# Patient Record
Sex: FEMALE | Race: WHITE | NOT HISPANIC OR LATINO | Employment: FULL TIME | URBAN - METROPOLITAN AREA
[De-identification: names, ages, dates, MRNs, and addresses within clinical notes are randomized per-mention and may not be internally consistent; named-entity substitution may affect disease eponyms.]

---

## 2023-05-25 ENCOUNTER — VIRTUAL VISIT (OUTPATIENT)
Dept: SURGERY | Facility: CLINIC | Age: 43
End: 2023-05-25
Payer: COMMERCIAL

## 2023-05-25 ENCOUNTER — TELEPHONE (OUTPATIENT)
Dept: ENDOCRINOLOGY | Facility: CLINIC | Age: 43
End: 2023-05-25

## 2023-05-25 ENCOUNTER — PREP FOR PROCEDURE (OUTPATIENT)
Dept: SURGERY | Facility: CLINIC | Age: 43
End: 2023-05-25

## 2023-05-25 VITALS — HEIGHT: 69 IN | WEIGHT: 160 LBS | BODY MASS INDEX: 23.7 KG/M2

## 2023-05-25 DIAGNOSIS — K56.1 INTUSSUSCEPTION OF JEJUNUM (H): Primary | ICD-10-CM

## 2023-05-25 DIAGNOSIS — K56.1 JEJUNAL INTUSSUSCEPTION (H): Primary | ICD-10-CM

## 2023-05-25 PROCEDURE — 99203 OFFICE O/P NEW LOW 30 MIN: CPT | Mod: VID | Performed by: SURGERY

## 2023-05-25 RX ORDER — PANTOPRAZOLE SODIUM 40 MG/1
40 TABLET, DELAYED RELEASE ORAL 2 TIMES DAILY
COMMUNITY
Start: 2023-05-17 | End: 2024-05-16

## 2023-05-25 RX ORDER — MULTIPLE VITAMINS W/ MINERALS TAB 9MG-400MCG
1 TAB ORAL EVERY MORNING
COMMUNITY

## 2023-05-25 RX ORDER — POLYETHYLENE GLYCOL 3350 17 G/17G
17 POWDER, FOR SOLUTION ORAL 2 TIMES DAILY
COMMUNITY
Start: 2023-05-22 | End: 2023-06-01

## 2023-05-25 RX ORDER — DICYCLOMINE HYDROCHLORIDE 10 MG/1
10 CAPSULE ORAL 4 TIMES DAILY
COMMUNITY
Start: 2023-05-22 | End: 2023-06-01

## 2023-05-25 ASSESSMENT — PAIN SCALES - GENERAL: PAINLEVEL: SEVERE PAIN (6)

## 2023-05-25 NOTE — TELEPHONE ENCOUNTER
FUTURE VISIT INFORMATION      SURGERY INFORMATION:    Date: 23    Location: [facility] or    Surgeon:  Malcolm Bartlett MD    Anesthesia Type:  general    Procedure: Exploratory laparoscopy; intestinal plication; possible small bowel resection; possible open    Consult: virtual visit 23    RECORDS REQUESTED FROM:       Primary Care Provider: Ihsan Hubbard MD  - [facility]    Most recent EKG+ Tracin/15/21- [facility]

## 2023-05-25 NOTE — TELEPHONE ENCOUNTER
Patient called to schedule surgery with Dr. Bartlett, seen via video visit today. She is a patient of his at [facility] in [place]. Scheduled Monday, June 12 at 10:45 a.m. She will need to arrive at [place] at 8:45 a.m., address given.   Scheduled PAC video visit on May 30 at 9 a.m.

## 2023-05-25 NOTE — LETTER
2023       RE: Reina Marmolejo  [address] [facility] [address]       Dear Colleague,    Thank you for referring your patient, Reina Marmolejo, to the [facility] [place] at [facility]. Please see a copy of my visit note below.        Return Bariatric Surgery Note    RE: Reina Marmolejo  MR#: [id number]  : 1980  VISIT DATE: May 25, 2023      I had the pleasure of seeing your patient, Reina Marmolejo, in my post-bariatric surgery assessment clinic.    CHIEF COMPLAINT: Post-bariatric surgery follow-up    Reina is well-known to my bariatric team in [facility] and I saw her today by video visit from my clinic in [place].    She has long-standing history of right of midline abdominal pain; started initially in midline; this causes sometimes quite severe episodes of pain.    Has intussusception on CT scan; this was not seen previously.    Had RYGB, open; divided technique by Dr. Salas, [facility] in .  Operative report appended below.    Had endoscopy by me in [place] last week; had nice relief of pain after the endoscopy (despite no interventions; no findings as cause of pain).  This would be theoretically consistent with reduction of the intussusception for a period.  CT scan was ordered out of that visit.      CT scan from last week as follows:           Weight: 72.6 kg (160 lb)       Social History:       View : No data to display.                Medications:  Current Outpatient Medications   Medication    cholecalciferol (D-VI-SOL, VITAMIN D3) 10 mcg/mL (400 units/mL) LIQD liquid    dicyclomine (BENTYL) 10 MG capsule    levonorgestrel (MIRENA) 20 MCG/DAY IUD    multivitamin w/minerals (MULTI-VITAMIN) tablet    pantoprazole (PROTONIX) 40 MG EC tablet    polyethylene glycol (MIRALAX) 17 GM/Dose powder     No current facility-administered medications for this visit.          View : No data  "to display.                ROS:  GI:        View : No data to display.              Skin:        View : No data to display.              Psych:        View : No data to display.              :        View : No data to display.                LABS/IMAGING/MEDICAL RECORDS REVIEW: reviewed CT scan from [facility]; imaging available in [facility] medical record.      PHYSICAL EXAMINATION:  Ht 1.753 m (5' 9\")   Wt 72.6 kg (160 lb)   BMI 23.63 kg/m     NAD  Breathing unlabored.  Rest of exam deferred due to virtual visit      ASSESSMENT AND PLAN:      1. 20 years status open gastric bypass by [facility] Sal[facility].  2. Morbid Obesity historical current BMI: Body mass index is 23.63 kg/m .  3. Post surgical malabsorption:   Labs ordered per protocol.   Follow food plan per dietitian recommendations.   Continue taking recommended post-op vitamins.  4. Return to clinic as needed.    5. Exploratory laparoscopy indicated to evaluate for intussusception; will plan plication of intestine; potentially will need bowel resection and redo of jejunojejunostomy.  Would be 120 minutes of surgery time.  Possible open surgery also discussed with Reina.    [facility]; Call [facility] at [phone] for scheduling.    Need preop H&P within 30 days of surgery; can do with primary or video PAC.    Plan SDS, possible overnight observation if conversion to open and/or resection needed.          Sincerely,    Malcolm Bartlett MD    I spent a total of 30 minutes face to face with Reina during today's office visit. Over 50% of this time was spent counseling the patient and/or coordinating care.      Procedures          PREOPERATIVE DIAGNOSIS:       Morbid obesity.            POSTOPERATIVE DIAGNOSIS:      Same.            OPERATIVE PROCEDURE:    DIVIDED KERRY-Y GASTRIC BYPASS, PAXTON GASTROSTOMY,                                    INTRAOPERATIVE ULTRASONOGRAPHY.            SURGEON:   Toney Salas MD            ANESTHESIA:  General " with endotracheal tube.            INDICATIONS FOR PROCEDURE:   This 22-year-old female has a history of      progressive weight gain.  Her body mass index exceeds morbidly obese      levels.  She has attempted weight loss unsuccessfully utilizing a variety      of medical weight loss programs, as documented in the admission history and      physical.  She suffers obesity-related co-morbidities, including chronic      dyspnea on exertion and fatigue, discomfort in weightbearing joints,      urinary stress incontinence, psychosocial problems secondary to obesity,      hyperlipidemia and in addition has a strong family history of morbid      obesity and obesity-related illnesses.  She has been counseled concerning      the disease of morbid obesity, including its causation and health risks,      and a variety of weight loss programs and their relative success rates were      described.  The operation of divided Dayrn-Y gastric bypass was discussed in      detail, including how the operation is performed and how it aids in weight      loss and weight maintenance.  Selection criteria, contraindications, short      and long term risks, and other sequelae of operation and a realistic      expectation of weight loss were thoroughly discussed.  The prime importance      of her own personal commitment to permanent changes in eating behaviors and      other healthy lifestyle changes following surgery in order to be successful      was stressed, especially use of the small gastric pouch as a tool to aid in      appetite control so as to successfully restrict the types and amounts of      foods ingested on a daily basis.  The importance of long term medical      follow-up and behavioral aftercare to the long term success of the program      was stressed.  She underwent preoperative dietary counseling and a      multidisciplinary psychological and medical evaluation with no      contraindications to a bariatric procedure  identified.  Smoking cessation      was accomplished and she was started on preoperative pulmonary toilet and      was admitted for a divided Daryn-Y gastric bypass, Leola gastrostomy and      intraoperative ultrasonography.            OPERATIVE FINDINGS:  Considerable subcutaneous and intra-abdominal fat was      noted.  The liver was of normal size and consistency.  The gallbladder was      free of palpable calculi and had no gallstones on ultrasonography.  The      spleen, distal esophagus, stomach, duodenum, pancreas, large and small      bowel, uterus, tubes and ovaries were normal to inspection and/or      palpation.            Continued . . .            COMPLICATIONS:  None apparent.            SPONGE, NEEDLE AND INSTRUMENT COUNTS:  Correct.            ESTIMATED BLOOD LOSS:   175 cc.            PROCEDURE:  The patient received preoperative IV cefotetan.  An epidural      catheter was inserted by the anesthesiologist for postoperative pain      control.  General endotracheal anesthesia was induced.  Sequential      compression stockings were applied, and a Michelle catheter and balloon      orogastric tubes were positioned.  The patient's abdomen was prepped with      Prevail and sterilely draped.            An upper midline incision was made transecting the midline fascia and      peritoneum.  Exploration was performed with findings as noted above.  A      wound protector was inserted.  Intraoperative ultrasonography was performed      with normal findings.  The ligament of Treitz was identified and a 45 cm      proximal biliopancreatic jejunal limb length measured along the      antimesenteric surface of the proximal jejunum.  The jejunum was divided      and ligated at this location with the Endopath linear stapler with 3.5 mm      staples.  Its mesentery was divided and ligated with the Endopath stapler      with 2.0 mm staples.  The 3-0 silk stay sutures were applied to the      mesenteric and antimesenteric  corners of the distally transected jejunal      segment, which will become the proximal alimentary limb.  A 30 inch      alimentary limb length was measured along the antimesenteric surface of the      proximal alimentary limb.  The antimesenteric surface of the distal      alimentary limb was approximated to the antimesenteric surface of the      distal biliopancreatic jejunal limb with two 3-0 silk stay sutures.  An      antimesenteric jejunotomy was created in the distal alimentary limb and      some of the staples closing the antimesenteric corner of the distal      biliopancreatic jejunal limb were excised.  One blade each of an open      Endopath linear stapler with 3.5 mm staples was inserted into each lumen      and the instrument was closed and fired simultaneously stapling and      dividing the opposing antimesenteric jejunal walls.  Intraluminal      inspection revealed one small bleeding point which was controlled with a      figure-of-eight suture of 3-0 chromic.  The anastomosis was completed with      an inner layer of continuous 4-0 chromic Shelli sutures and an outer layer      of interrupted seromuscular 3-0 silk sutures.  The distal biliopancreatic      jejunal wall was imbricated around the distal everted staple line closure      with interrupted  3-0 silk sutures.  A 3-0 silk suture was placed at the      heel of the anastomosis to take tension off of it.  A viable and palpable      anastomosis was noted.            The patient was placed in reversed Trendelenburg position.  With the aid of      the Omni bariatric retractor, the costal margins and left hepatic lobe were                  Continued . . .      retracted.  The distal esophagus was bluntly encircled in a Penrose drain.      The gastric balloon of the orogastric tube was inflated by the anesthetist      with exactly 15 cc of air and snugged at the gastroesophageal junction.      The balloon was utilized as a guide to measure a  proximal lesser curvature      15 cc gastric pouch, the periphery of which was marked with several silk      sutures on the anterior gastric wall.  A suitable location for      gastrojejunostomy was selected in what will become the proximal gastric      pouch and marked with cautery.  The balloon was deflated and the orogastric      tube withdrawn into the distal esophagus.  A small window was created in      the upper gastrohepatic ligament adjacent to the cardia.  The cardia was      then encircled in two Spence catheters.  A short transverse gastrotomy      was created in the anterior wall of the gastric body adjacent to the lesser      curvature.  The anvil of an Ethicon-25 mm circular stapler was inserted      through the gastrotomy and its shanda made to exit the anterior gastric wall      at the site selected for the gastrojejunostomy.  The gastrotomy was closed      with an inner layer of continuous locking 4-0 chromic sutures and an outer      layer of interrupted seromuscular 3-0 silk sutures.  The proximal and      distal gastric pouches were constructed by multiple applications of the      Endopath linear stapler with 3.5 mm staples guided by the Spence      catheters and with reference to previously placed silk sutures.  In      addition, the orogastric tube was temporarily reinserted into the proximal      gastric pouch as a guide to the pouch and distal esophagus.  The staple      lines appeared intact without hemorrhage.  The walls of the proximal      gastric pouch were imbricated around the proximal gastric staple line with      multiple interrupted 3-0 silk sutures.  A retrocolic/retrogastric tunnel      was constructe   d.  The proximal alimentary limb was advanced through the      retrocolic/retrogastric tunnel with care to avoid twisting the bowel or its      mesentery.  The staples closing the proximal alimentary limb were excised.      The cartridge of an Ethicon-25 mm circular stapler was  inserted through the      open end of the alimentary limb and advanced approximately 15 cm distally.      The shanda of the cartridge was advanced through the antimesenteric bowel      wall.  The exposed rods of the cartridge and anvil were mated and the      stapler was closed to the prescribed thickness, taking care to avoid      introduction of extraneous tissue.  The stapler was fired and opened      one-half turn.  By rotation in both directions the stapler was removed      through the proximal alimentary limb.  Inspection revealed an intact      appearing gastrojejunostomy.  The gastrojejunostomy was reinforced with a      continuous imbricating circumferential 3-0 Monocryl suture.  The proximal      alimentary limb was closed with an inner layer of continuous 4-0 chromic      Orleans sutures and an outer layer of interrupted seromuscular 3-0 silk      sutures.            The orogastric tube was then removed and an 18 sump nasogastric tube      advanced by the anesthetist and guided through the gastrojejunostomy and      down the efferent limb for approximately one inch.  The patient was then      placed flat and the upper abdomen was filled with saline.  Following finger                  Continued . . .      occlusion of the afferent and efferent limbs of the gastrojejunostomy      distal to the NG tube tip, the anesthetist distended the proximal gastric      pouch and perianastomotic jejunum with air.  No bubbling was noted,      indicating air tightness of the gastrojejunostomy and proximal gastric      staple lines.  The afferent limb was then interposed between the proximal      and distal gastric staple line closures.  The antimesenteric wall of the      afferent limb was imbricated around the proximal gastric staple line      closure with multiple interrupted 3-0 silk sutures to physically separate      the proximal and distal gastric pouch and discourage gastrogastric      fistulization.  Redundancy of  the efferent limb was reduced below the level      of the transverse mesocolon.  All potential internal hernial defects were      closed with continuous 2-0 silk, including those at Guerrier's space,      transverse mesocolon and at the mesenteric defect of the jejunojejunostomy.            The abdomen was then irrigated with copious amounts of saline and Kefzol      solution.  Hemostasis was satisfactory.  A 19 Gaurav drain was inserted      through a left flank stab wound and positioned in the left subphrenic space      with its tip adjacent to the anterior aspect of the gastrojejunostomy.  The      drain was sutured to the skin with 2-0 silk.  An 18 silicone Michelle catheter      was introduced into the abdomen through a left upper quadrant stab wound.      Two concentric pursestring sutures of 2-0 silk were placed in the anterior      body of the bypassed stomach along the greater curvature.  A gastrotomy was      created within the pursestring sutures and the Michelle tip was introduced      intraluminally.  The balloon was inflated and the pursestring sutures tied.      The gastric wall just peripheral to where the tube entered the stomach was      sutured to the posterior aspect of the anterior abdominal wall just      peripheral to where the tube entered the abdomen with multiple interrupted      circumferentially placed 2-0 silk sutures.  A 2-0 silk suture fixed the      tube to the skin.  The midline fascia was reapproximated with continuous #1      PDS.  The subcutaneous layer was irrigated with saline.  The skin was      closed with skin staples.  A sterile dressing was applied.  A Michelle      drainage bag was connected to the gastrostomy and a reservoir bulb to the      Gaurav drain.            Anesthesia was discontinued.  She appeared to tolerate the procedure well.                                                                        Toney Salas MD      bab      D: 02/11/2003 4:02 P      T:  02/11/2003  6:00 P      Doc #: [id number]      cc:   MD Toney Vidal MD              Again, thank you for allowing me to participate in the care of your patient.      Sincerely,    Malcolm Bartlett MD

## 2023-05-25 NOTE — PROGRESS NOTES
Return Bariatric Surgery Note    RE: Reina Marmolejo  MR#: [id number]  : 1980  VISIT DATE: May 25, 2023      I had the pleasure of seeing your patient, Reina Marmolejo, in my post-bariatric surgery assessment clinic.    CHIEF COMPLAINT: Post-bariatric surgery follow-up    Reina is well-known to my bariatric team in [facility] and I saw her today by video visit from my clinic in [place].    She has long-standing history of right of midline abdominal pain; started initially in midline; this causes sometimes quite severe episodes of pain.    Has intussusception on CT scan; this was not seen previously.    Had RYGB, open; divided technique by Dr. Salas, [facility] in .  Operative report appended below.    Had endoscopy by me in [place] last week; had nice relief of pain after the endoscopy (despite no interventions; no findings as cause of pain).  This would be theoretically consistent with reduction of the intussusception for a period.  CT scan was ordered out of that visit.      CT scan from last week as follows:           Weight: 72.6 kg (160 lb)       Social History:       View : No data to display.                Medications:  Current Outpatient Medications   Medication     cholecalciferol (D-VI-SOL, VITAMIN D3) 10 mcg/mL (400 units/mL) LIQD liquid     dicyclomine (BENTYL) 10 MG capsule     levonorgestrel (MIRENA) 20 MCG/DAY IUD     multivitamin w/minerals (MULTI-VITAMIN) tablet     pantoprazole (PROTONIX) 40 MG EC tablet     polyethylene glycol (MIRALAX) 17 GM/Dose powder     No current facility-administered medications for this visit.          View : No data to display.                ROS:  GI:        View : No data to display.              Skin:        View : No data to display.              Psych:        View : No data to display.              :        View : No data to display.                LABS/IMAGING/MEDICAL RECORDS REVIEW: reviewed CT scan from [facility]  "system; imaging available in [facility] medical record.      PHYSICAL EXAMINATION:  Ht 1.753 m (5' 9\")   Wt 72.6 kg (160 lb)   BMI 23.63 kg/m     NAD  Breathing unlabored.  Rest of exam deferred due to virtual visit      ASSESSMENT AND PLAN:      1. 20 years status open gastric bypass by [facility] Sal[facility].  2. Morbid Obesity historical current BMI: Body mass index is 23.63 kg/m .  3. Post surgical malabsorption:   Labs ordered per protocol.   Follow food plan per dietitian recommendations.   Continue taking recommended post-op vitamins.  4. Return to clinic as needed.    5. Exploratory laparoscopy indicated to evaluate for intussusception; will plan plication of intestine; potentially will need bowel resection and redo of jejunojejunostomy.  Would be 120 minutes of surgery time.  Possible open surgery also discussed with Reina.    [facility]; Call [facility] at [phone] for scheduling.    Need preop H&P within 30 days of surgery; can do with primary or video PAC.    Plan SDS, possible overnight observation if conversion to open and/or resection needed.          Sincerely,    Malcolm Bartlett MD    I spent a total of 30 minutes face to face with Reina during today's office visit. Over 50% of this time was spent counseling the patient and/or coordinating care.      Procedures          PREOPERATIVE DIAGNOSIS:       Morbid obesity.            POSTOPERATIVE DIAGNOSIS:      Same.            OPERATIVE PROCEDURE:    DIVIDED KERRY-Y GASTRIC BYPASS, PAXTON GASTROSTOMY,                                    INTRAOPERATIVE ULTRASONOGRAPHY.            SURGEON:   Toney Salas MD            ANESTHESIA:  General with endotracheal tube.            INDICATIONS FOR PROCEDURE:   This 22-year-old female has a history of      progressive weight gain.  Her body mass index exceeds morbidly obese      levels.  She has attempted weight loss unsuccessfully utilizing a variety      of medical weight loss programs, as documented in " the admission history and      physical.  She suffers obesity-related co-morbidities, including chronic      dyspnea on exertion and fatigue, discomfort in weightbearing joints,      urinary stress incontinence, psychosocial problems secondary to obesity,      hyperlipidemia and in addition has a strong family history of morbid      obesity and obesity-related illnesses.  She has been counseled concerning      the disease of morbid obesity, including its causation and health risks,      and a variety of weight loss programs and their relative success rates were      described.  The operation of divided Daryn-Y gastric bypass was discussed in      detail, including how the operation is performed and how it aids in weight      loss and weight maintenance.  Selection criteria, contraindications, short      and long term risks, and other sequelae of operation and a realistic      expectation of weight loss were thoroughly discussed.  The prime importance      of her own personal commitment to permanent changes in eating behaviors and      other healthy lifestyle changes following surgery in order to be successful      was stressed, especially use of the small gastric pouch as a tool to aid in      appetite control so as to successfully restrict the types and amounts of      foods ingested on a daily basis.  The importance of long term medical      follow-up and behavioral aftercare to the long term success of the program      was stressed.  She underwent preoperative dietary counseling and a      multidisciplinary psychological and medical evaluation with no      contraindications to a bariatric procedure identified.  Smoking cessation      was accomplished and she was started on preoperative pulmonary toilet and      was admitted for a divided Daryn-Y gastric bypass, Leola gastrostomy and      intraoperative ultrasonography.            OPERATIVE FINDINGS:  Considerable subcutaneous and intra-abdominal fat was      noted.   The liver was of normal size and consistency.  The gallbladder was      free of palpable calculi and had no gallstones on ultrasonography.  The      spleen, distal esophagus, stomach, duodenum, pancreas, large and small      bowel, uterus, tubes and ovaries were normal to inspection and/or      palpation.            Continued . . .            COMPLICATIONS:  None apparent.            SPONGE, NEEDLE AND INSTRUMENT COUNTS:  Correct.            ESTIMATED BLOOD LOSS:   175 cc.            PROCEDURE:  The patient received preoperative IV cefotetan.  An epidural      catheter was inserted by the anesthesiologist for postoperative pain      control.  General endotracheal anesthesia was induced.  Sequential      compression stockings were applied, and a Michelle catheter and balloon      orogastric tubes were positioned.  The patient's abdomen was prepped with      Prevail and sterilely draped.            An upper midline incision was made transecting the midline fascia and      peritoneum.  Exploration was performed with findings as noted above.  A      wound protector was inserted.  Intraoperative ultrasonography was performed      with normal findings.  The ligament of Treitz was identified and a 45 cm      proximal biliopancreatic jejunal limb length measured along the      antimesenteric surface of the proximal jejunum.  The jejunum was divided      and ligated at this location with the Endopath linear stapler with 3.5 mm      staples.  Its mesentery was divided and ligated with the Endopath stapler      with 2.0 mm staples.  The 3-0 silk stay sutures were applied to the      mesenteric and antimesenteric corners of the distally transected jejunal      segment, which will become the proximal alimentary limb.  A 30 inch      alimentary limb length was measured along the antimesenteric surface of the      proximal alimentary limb.  The antimesenteric surface of the distal      alimentary limb was approximated to the  antimesenteric surface of the      distal biliopancreatic jejunal limb with two 3-0 silk stay sutures.  An      antimesenteric jejunotomy was created in the distal alimentary limb and      some of the staples closing the antimesenteric corner of the distal      biliopancreatic jejunal limb were excised.  One blade each of an open      Endopath linear stapler with 3.5 mm staples was inserted into each lumen      and the instrument was closed and fired simultaneously stapling and      dividing the opposing antimesenteric jejunal walls.  Intraluminal      inspection revealed one small bleeding point which was controlled with a      figure-of-eight suture of 3-0 chromic.  The anastomosis was completed with      an inner layer of continuous 4-0 chromic [place] sutures and an outer layer      of interrupted seromuscular 3-0 silk sutures.  The distal biliopancreatic      jejunal wall was imbricated around the distal everted staple line closure      with interrupted  3-0 silk sutures.  A 3-0 silk suture was placed at the      heel of the anastomosis to take tension off of it.  A viable and palpable      anastomosis was noted.            The patient was placed in reversed Trendelenburg position.  With the aid of      the Omni bariatric retractor, the costal margins and left hepatic lobe were                  Continued . . .      retracted.  The distal esophagus was bluntly encircled in a Penrose drain.      The gastric balloon of the orogastric tube was inflated by the anesthetist      with exactly 15 cc of air and snugged at the gastroesophageal junction.      The balloon was utilized as a guide to measure a proximal lesser curvature      15 cc gastric pouch, the periphery of which was marked with several silk      sutures on the anterior gastric wall.  A suitable location for      gastrojejunostomy was selected in what will become the proximal gastric      pouch and marked with cautery.  The balloon was deflated and the  orogastric      tube withdrawn into the distal esophagus.  A small window was created in      the upper gastrohepatic ligament adjacent to the cardia.  The cardia was      then encircled in two Spence catheters.  A short transverse gastrotomy      was created in the anterior wall of the gastric body adjacent to the lesser      curvature.  The anvil of an Ethicon-25 mm circular stapler was inserted      through the gastrotomy and its shanda made to exit the anterior gastric wall      at the site selected for the gastrojejunostomy.  The gastrotomy was closed      with an inner layer of continuous locking 4-0 chromic sutures and an outer      layer of interrupted seromuscular 3-0 silk sutures.  The proximal and      distal gastric pouches were constructed by multiple applications of the      Endopath linear stapler with 3.5 mm staples guided by the Spence      catheters and with reference to previously placed silk sutures.  In      addition, the orogastric tube was temporarily reinserted into the proximal      gastric pouch as a guide to the pouch and distal esophagus.  The staple      lines appeared intact without hemorrhage.  The walls of the proximal      gastric pouch were imbricated around the proximal gastric staple line with      multiple interrupted 3-0 silk sutures.  A retrocolic/retrogastric tunnel      was constructe   d.  The proximal alimentary limb was advanced through the      retrocolic/retrogastric tunnel with care to avoid twisting the bowel or its      mesentery.  The staples closing the proximal alimentary limb were excised.      The cartridge of an Ethicon-25 mm circular stapler was inserted through the      open end of the alimentary limb and advanced approximately 15 cm distally.      The shanda of the cartridge was advanced through the antimesenteric bowel      wall.  The exposed rods of the cartridge and anvil were mated and the      stapler was closed to the prescribed thickness, taking care to  avoid      introduction of extraneous tissue.  The stapler was fired and opened      one-half turn.  By rotation in both directions the stapler was removed      through the proximal alimentary limb.  Inspection revealed an intact      appearing gastrojejunostomy.  The gastrojejunostomy was reinforced with a      continuous imbricating circumferential 3-0 Monocryl suture.  The proximal      alimentary limb was closed with an inner layer of continuous 4-0 chromic      Shelli sutures and an outer layer of interrupted seromuscular 3-0 silk      sutures.            The orogastric tube was then removed and an 18 sump nasogastric tube      advanced by the anesthetist and guided through the gastrojejunostomy and      down the efferent limb for approximately one inch.  The patient was then      placed flat and the upper abdomen was filled with saline.  Following finger                  Continued . . .      occlusion of the afferent and efferent limbs of the gastrojejunostomy      distal to the NG tube tip, the anesthetist distended the proximal gastric      pouch and perianastomotic jejunum with air.  No bubbling was noted,      indicating air tightness of the gastrojejunostomy and proximal gastric      staple lines.  The afferent limb was then interposed between the proximal      and distal gastric staple line closures.  The antimesenteric wall of the      afferent limb was imbricated around the proximal gastric staple line      closure with multiple interrupted 3-0 silk sutures to physically separate      the proximal and distal gastric pouch and discourage gastrogastric      fistulization.  Redundancy of the efferent limb was reduced below the level      of the transverse mesocolon.  All potential internal hernial defects were      closed with continuous 2-0 silk, including those at Guerrier's space,      transverse mesocolon and at the mesenteric defect of the jejunojejunostomy.            The abdomen was then irrigated  with copious amounts of saline and Kefzol      solution.  Hemostasis was satisfactory.  A 19 Gaurav drain was inserted      through a left flank stab wound and positioned in the left subphrenic space      with its tip adjacent to the anterior aspect of the gastrojejunostomy.  The      drain was sutured to the skin with 2-0 silk.  An 18 silicone Michelle catheter      was introduced into the abdomen through a left upper quadrant stab wound.      Two concentric pursestring sutures of 2-0 silk were placed in the anterior      body of the bypassed stomach along the greater curvature.  A gastrotomy was      created within the pursestring sutures and the Michelle tip was introduced      intraluminally.  The balloon was inflated and the pursestring sutures tied.      The gastric wall just peripheral to where the tube entered the stomach was      sutured to the posterior aspect of the anterior abdominal wall just      peripheral to where the tube entered the abdomen with multiple interrupted      circumferentially placed 2-0 silk sutures.  A 2-0 silk suture fixed the      tube to the skin.  The midline fascia was reapproximated with continuous #1      PDS.  The subcutaneous layer was irrigated with saline.  The skin was      closed with skin staples.  A sterile dressing was applied.  A Michelle      drainage bag was connected to the gastrostomy and a reservoir bulb to the      Gaurav drain.            Anesthesia was discontinued.  She appeared to tolerate the procedure well.                                                                        Toney Salas MD      bab      D: 02/11/2003 4:02 P      T: 02/11/2003  6:00 P      Doc #: [id number]      cc:   MD Toney Vidal MD

## 2023-05-25 NOTE — NURSING NOTE
"Chief Complaint   Patient presents with     New Patient     Blockage in scans       Vitals:    05/25/23 1038   Weight: 72.6 kg (160 lb)   Height: 1.753 m (5' 9\")       Body mass index is 23.63 kg/m .                          Benny Vega, EMT    "

## 2023-05-30 ENCOUNTER — PRE VISIT (OUTPATIENT)
Dept: SURGERY | Facility: CLINIC | Age: 43
End: 2023-05-30

## 2023-05-30 ENCOUNTER — ANESTHESIA EVENT (OUTPATIENT)
Dept: SURGERY | Facility: CLINIC | Age: 43
End: 2023-05-30
Payer: COMMERCIAL

## 2023-05-30 ENCOUNTER — VIRTUAL VISIT (OUTPATIENT)
Dept: SURGERY | Facility: CLINIC | Age: 43
End: 2023-05-30
Payer: COMMERCIAL

## 2023-05-30 DIAGNOSIS — Z01.818 PRE-OP EVALUATION: Primary | ICD-10-CM

## 2023-05-30 DIAGNOSIS — K56.1 JEJUNAL INTUSSUSCEPTION (H): ICD-10-CM

## 2023-05-30 PROCEDURE — 99203 OFFICE O/P NEW LOW 30 MIN: CPT | Mod: VID | Performed by: NURSE PRACTITIONER

## 2023-05-30 ASSESSMENT — ENCOUNTER SYMPTOMS: SEIZURES: 0

## 2023-05-30 ASSESSMENT — LIFESTYLE VARIABLES: TOBACCO_USE: 1

## 2023-05-30 NOTE — PATIENT INSTRUCTIONS
Preparing for Your Surgery      Name:  Reina Marmolejo   MRN:  [id number]   :  1980   Today's Date:  2023       Arriving for surgery:  Surgery date:  23  Arrival time:  8:45 am    Please come to:     [facility] [address]  [address] [place]  [address]    -   parking is available in front of the hospital for those with mobility difficulties.     -  Parking is available at the Patient Visitor Ramp on [address] and [facility].    -  When entering the hospital, you will be asked some Covid screening questions and directed to Patient Registration. Patient Registration will then direct you to the 3rd floor Surgery Waiting Room.  [phone]?     - ?If you are in need of directions, wheelchair or escort please stop at the Information Desk in the lobby.  Inform the information person that you are here for surgery; a wheelchair and escort to [address] will be provided.?     What can I eat or drink?  -  If you were given bowel prep and clear liquid diet instructions from Dr. [facility], follow those instructions.  -  You may have clear liquids until 2 hours prior to arrival time. (Until 6:45 am)    If you were not given any bowel prep instructions:  -  You may eat and drink normally up to 8 hours prior to arrival time. (Until 12:45 am)  -  You may have clear liquids until 2 hours prior to arrival time. (Until 6:45 am)    Examples of clear liquids:  Water  Clear broth  Juices (apple, white grape, white cranberry  and cider) without pulp  Noncarbonated, powder based beverages  (lemonade and Ruben-Aid)  Sodas (Sprite, 7-Up, ginger ale and seltzer)  Coffee or tea (without milk or cream)  Gatorade    -  No Alcohol for at least 24 hours before surgery.     Which medicines can I take?  Hold Aspirin for 7 days before surgery.   Hold Multivitamins for 7 days before surgery.  Hold Supplements for 7 days before surgery.  Hold Ibuprofen  (Advil, Motrin) for 1 day before surgery--unless otherwise directed by surgeon.  Hold Naproxen (Aleve) for 4 days before surgery.    -  DO NOT take these medications the day of surgery:  Dicyclomine (Bentyl)  Cholecalciferol (Vitamin D3)  Polyethylene Glycol (Miralax)    -  PLEASE TAKE these medications the day of surgery:  Pantoprazole (Protonix)    How do I prepare myself?  - Please take 2 showers (one the night prior to surgery and one the morning of surgery) using Scrubcare or Hibiclens soap.    Use this soap only from the neck to your toes.     Leave the soap on your skin for one minute--then rinse thoroughly.      You may use your own shampoo and conditioner. No other hair products.   - Please remove all jewelry and body piercings.  - No lotions, deodorants or fragrance.  - No makeup or fingernail polish.   - Bring your ID and insurance card.    -If you have a Deep Brain Stimulator, Spinal Cord Stimulator, or any Neuro Stimulator device---you must bring the remote control to the hospital.      ALL PATIENTS GOING HOME THE SAME DAY OF SURGERY ARE REQUIRED TO HAVE A RESPONSIBLE ADULT TO DRIVE AND BE IN ATTENDANCE WITH THEM FOR 24 HOURS FOLLOWING SURGERY.    Covid testing policy as of 12/06/2022  Your surgeon will notify and schedule you for a COVID test if one is needed before surgery--please direct any questions or COVID symptoms to your surgeon      Questions or Concerns:    - For any questions regarding the day of surgery or your hospital stay, please contact the Pre Admission Nursing Office at [phone].       - If you have health changes between today and your surgery, please call your surgeon.       - For questions after surgery, please call your surgeons office.           Current Visitor Guidelines     Visiting hours: 8 a.m. to 8:30 p.m.     Patients confirmed or suspected to have symptoms of COVID 19 or flu:     No visitors allowed for adult patients.   Children (under age 18) can have 1 named  visitor.     People who are sick or showing symptoms of COVID 19 or flu:    Are not allowed to visit patients--we can only make exceptions in special situations.       Please follow these guidelines for your visit:          Please maintain social distance          Masking is optional--however at times you may be asked to wear a mask for the safety of yourself and others     Clean your hands with alcohol hand . Do this when you arrive at and leave the building and patient room,    And again after you touch your mask or anything in the room.     Go directly to and from the room you are visiting.     Stay in the patient s room during your visit. Limit going to other places in the hospital as much as possible     Leave bags and jackets at home or in the car.     For everyone s health, please don t come and go during your visit. That includes for smoking   during your visit.

## 2023-05-30 NOTE — H&P
Pre-Operative H & P     CC:  Preoperative exam to assess for increased cardiopulmonary risk while undergoing surgery and anesthesia.    Date of Encounter: 2023  Primary Care Physician:  No primary care provider on file.     Reason for visit: No diagnosis found.    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 and history of alcohol abuse.    She has been experiencing chronic right sided abdominal pain for approximately 1 year. She has had an extensive evaluation including CT and EGD and was found to have intussusception at the jejunal anastomosis. She was evaluated by Dr. Bartlett on 2023 where treatment options were discussed and a plan was made to proceed with surgery as scheduled above.    History is obtained from the patient and chart review    Hx of abnormal bleeding or anti-platelet use: None    Menstrual history: No LMP recorded.     Past Medical History  Past Medical History:   Diagnosis Date     Constipation      GERD (gastroesophageal reflux disease)      History of alcohol use disorder      History of gastric bypass      Hypothyroidism      Jejunal intussusception (H)      Tobacco use      Vitamin D deficiency        Past Surgical History  Past Surgical History:   Procedure Laterality Date     ARTHROSCOPY KNEE Left       SECTION       EGD       GASTRIC BYPASS       INSERT  INTRAUTERINE DEVICE         Prior to Admission Medications  Current Outpatient Medications   Medication Sig Dispense Refill     cholecalciferol (D-VI-SOL, VITAMIN D3) 10 mcg/mL (400 units/mL) LIQD liquid Take 1 mL by mouth every morning       dicyclomine (BENTYL) 10 MG capsule Take 10 mg by mouth 4 times daily       levonorgestrel (MIRENA) 20 MCG/DAY IUD 1 Intra Uterine Device by Intrauterine route once       multivitamin w/minerals (MULTI-VITAMIN) tablet Take 1 tablet by mouth every morning       pantoprazole (PROTONIX) 40 MG EC tablet Take 40 mg by mouth 2 times daily       polyethylene glycol (MIRALAX) 17 GM/Dose powder Take 17 g by mouth 2 times daily         Allergies  Allergies   Allergen Reactions     Codeine Unknown     Hydrocodone-Acetaminophen      Other reaction(s): Unknown Reaction       Social History  Social History     Socioeconomic History     Marital status:      Spouse name: Not on file     Number of children: Not on file     Years of education: Not on file     Highest education level: Not on file   Occupational History     Not on file   Tobacco Use     Smoking status: Every Day     Packs/day: 0.50     Years: 20.00     Pack years: 10.00     Types: Cigarettes     Passive exposure: Current     Smokeless tobacco: Never   Vaping Use     Vaping status: Not on file   Substance and Sexual Activity     Alcohol use: Yes     Comment: 2-3 drinks a month     Drug use: Never     Sexual activity: Not on file   Other Topics Concern     Not on file   Social History Narrative     Not on file     Social Determinants of Health     Financial Resource Strain: Not on file   Food Insecurity: Not on file   Transportation Needs: Not on file   Physical Activity: Not on file   Stress: Not on file   Social Connections: Not on file   Intimate Partner Violence: Not on file   Housing Stability: Not on file       Family History  Family History   Problem Relation Age of Onset     Deep Vein Thrombosis (DVT) Father       Anesthesia Reaction No family hx of        Review of Systems  The complete review of systems is negative other than noted in the HPI or here.   Anesthesia Evaluation   Pt has had prior anesthetic. Type: General.    No history of anesthetic complications       ROS/MED HX  ENT/Pulmonary:     (+) tobacco use,  (-) asthma, EMMA risk factors and recent URI   Neurologic:     (+) migraines (Remote hx - has been 15 years since last ),  (-) no seizures and no CVA   Cardiovascular:  - neg cardiovascular ROS   (+) -----No previous cardiac testing  (-) hypertension, LUTZ and dyslipidemia   METS/Exercise Tolerance:  Comment: Walking daily 2 miles   Hematologic:  - neg hematologic  ROS  (-) anemia and history of blood transfusion   Musculoskeletal:  - neg musculoskeletal ROS     GI/Hepatic: Comment: Hx of gastric bypass  Jejunal intussusception  Constipation    (+) GERD, Asymptomatic on medication,  (-) liver disease   Renal/Genitourinary:  - neg Renal ROS     Endo:     (+) thyroid problem (not currently on medications), hypothyroidism,  (-) Type II DM   Psychiatric/Substance Use:     (+) psychiatric history anxiety alcohol abuse (outpatient therapy 2017)     Infectious Disease:  - neg infectious disease ROS  (-) Recent Fever   Malignancy:  - neg malignancy ROS     Other:  - neg other ROS          Virtual visit -  No vitals were obtained    Physical Exam  Constitutional: Awake, alert, cooperative, no apparent distress, and appears stated age.  Eyes: Pupils equal  HENT: Normocephalic  Respiratory: non labored breathing   Neurologic: Awake, alert, oriented to name, place and time.   Neuropsychiatric: Calm, cooperative. Normal affect.      Prior Labs/Diagnostic Studies   All labs and imaging personally reviewed     EKG/ stress test - if available please see in ROS above   No results found.       View : No data to display.              The patient's records and results personally reviewed by this provider.     Outside records reviewed  "from: Care Everywhere      Assessment  Reina Marmolejo is a 43 year old female seen as a PAC referral for risk assessment and optimization for anesthesia.    Plan/Recommendations  Pt will be optimized for the proposed procedure.  See below for details on the assessment, risk, and preoperative recommendations    NEUROLOGY  - No history of TIA, CVA or seizure  - Remote history of migraine headaches, has not had one for ~15 years    -Post Op delirium risk factors:  No risk identified    ENT  - No current airway concerns.  Will need to be reassessed day of surgery.  Mallampati: Unable to assess  TM: Unable to assess    CARDIAC  - No history of CAD, Hypertension and Afib  - METS (Metabolic Equivalents)  Patient performs 4 or more METS exercise without symptoms            Total Score: 0      RCRI-Very low risk: Class 1 0.4% complication rate            Total Score: 0        PULMONARY    EMMA Low Risk            Total Score: 0      - Denies asthma or inhaler use  - Tobacco History    History   Smoking Status     Every Day     Packs/day: 0.50     Years: 20.00     Types: Cigarettes   Smokeless Tobacco     Never       GI  - GERD  Controlled on medications: Proton Pump Inhibitor  PONV Medium Risk  Total Score: 2           1 AN PONV: Pt is Female    1 AN PONV: Intended Post Op Opioids       - Chronic abdominal pain/jejunal intussusception: surgery as scheduled above  - Hx of RNY gastric bypass  - Constipation    /RENAL  - Baseline Creatinine  0.78    ENDOCRINE [id number]}  - BMI: Estimated body mass index is 23.63 kg/m  as calculated from the following:    Height as of 5/25/23: 1.753 m (5' 9\").    Weight as of 5/25/23: 72.6 kg (160 lb).  Healthy Weight (BMI 18.5-24.9)  - No history of Diabetes Mellitus  - History of hypothyroidism. Not currently on medications.    HEME  VTE Low Risk 0.5%            Total Score: 4    VTE: Family Hx of VTE      - No history of abnormal bleeding or antiplatelet use.      PSYCH  - Anxiety: no " current concerns  - History of alcohol abuse, completed outpatient treatment 2017. Now with minimal alcohol use.    Different anesthesia methods/types have been discussed with the patient, but they are aware that the final plan will be decided by the assigned anesthesia provider on the date of service.    The patient is optimized for their procedure. AVS with information on surgery time/arrival time, meds and NPO status given by nursing staff. No further diagnostic testing indicated.    Please refer to the physical examination documented by the anesthesiologist in the anesthesia record on the day of surgery.    Video-Visit Details    Type of service:  Video Visit    Provider received verbal consent for a Video Visit from the patient? Yes   Video Start Time: 0858  Video End Time:0910    Originating Location (pt. Location): Parked in their car outside of work  Distant Location (provider location):  Off-site  Mode of Communication:  Video Conference via [employer]  On the day of service:     Prep time: 5 minutes  Visit time: 12 minutes  Documentation time: 16 minutes  ------------------------------------------  Total time: 33 minutes      Karey Howard PA-C  Preoperative Assessment Center  [facility]  Phone: [phone]  Fax: [phone]

## 2023-05-30 NOTE — PROGRESS NOTES
Reina is a 43 year old who is being evaluated via a billable video visit.      How would you like to obtain your AVS? MyChart      Subjective   Reina is a 43 year old, presenting for the following health issues:  Pre-Op Exam (/)    HPI           Review of Systems       Physical Exam     ONEYDA Ortiz LPN

## 2023-06-01 ENCOUNTER — TELEPHONE (OUTPATIENT)
Dept: SURGERY | Facility: CLINIC | Age: 43
End: 2023-06-01
Payer: COMMERCIAL

## 2023-06-01 NOTE — TELEPHONE ENCOUNTER
Patient left VM message stating Qi wants to do an emergency colonoscopy June 6. Patient is scheduled for exploratory laparoscopy, intestinal plication, possible open, possible small bowel resection at [place] on June 12.  Per Dr. Bartlett, okay to proceed with colonoscopy. Comments related to the patient today.

## 2023-06-12 ENCOUNTER — ANESTHESIA (OUTPATIENT)
Dept: SURGERY | Facility: CLINIC | Age: 43
End: 2023-06-12
Payer: COMMERCIAL

## 2023-06-12 ENCOUNTER — HOSPITAL ENCOUNTER (OUTPATIENT)
Facility: CLINIC | Age: 43
Discharge: HOME | End: 2023-06-12
Attending: SURGERY | Admitting: SURGERY
Payer: COMMERCIAL

## 2023-06-12 VITALS
WEIGHT: 164.68 LBS | BODY MASS INDEX: 24.39 KG/M2 | DIASTOLIC BLOOD PRESSURE: 82 MMHG | HEART RATE: 78 BPM | HEIGHT: 69 IN | OXYGEN SATURATION: 97 % | RESPIRATION RATE: 16 BRPM | TEMPERATURE: 97.7 F | SYSTOLIC BLOOD PRESSURE: 131 MMHG

## 2023-06-12 DIAGNOSIS — K56.1 JEJUNAL INTUSSUSCEPTION (H): Primary | ICD-10-CM

## 2023-06-12 PROCEDURE — 272N000001 HC OR GENERAL SUPPLY STERILE: Performed by: SURGERY

## 2023-06-12 PROCEDURE — 250N000011 HC RX IP 250 OP 636: Performed by: STUDENT IN AN ORGANIZED HEALTH CARE EDUCATION/TRAINING PROGRAM

## 2023-06-12 PROCEDURE — 250N000009 HC RX 250: Performed by: STUDENT IN AN ORGANIZED HEALTH CARE EDUCATION/TRAINING PROGRAM

## 2023-06-12 PROCEDURE — 370N000017 HC ANESTHESIA TECHNICAL FEE, PER MIN: Performed by: SURGERY

## 2023-06-12 PROCEDURE — 250N000025 HC SEVOFLURANE, PER MIN: Performed by: SURGERY

## 2023-06-12 PROCEDURE — 250N000013 HC RX MED GY IP 250 OP 250 PS 637: Performed by: STUDENT IN AN ORGANIZED HEALTH CARE EDUCATION/TRAINING PROGRAM

## 2023-06-12 PROCEDURE — 999N000141 HC STATISTIC PRE-PROCEDURE NURSING ASSESSMENT: Performed by: SURGERY

## 2023-06-12 PROCEDURE — 710N000012 HC RECOVERY PHASE 2, PER MINUTE: Performed by: SURGERY

## 2023-06-12 PROCEDURE — 44238 UNLISTED LAPS PX INTESTINE: CPT | Mod: GC | Performed by: SURGERY

## 2023-06-12 PROCEDURE — 250N000013 HC RX MED GY IP 250 OP 250 PS 637: Performed by: ANESTHESIOLOGY

## 2023-06-12 PROCEDURE — 250N000011 HC RX IP 250 OP 636: Performed by: SURGERY

## 2023-06-12 PROCEDURE — 360N000077 HC SURGERY LEVEL 4, PER MIN: Performed by: SURGERY

## 2023-06-12 PROCEDURE — 250N000011 HC RX IP 250 OP 636: Performed by: ANESTHESIOLOGY

## 2023-06-12 PROCEDURE — 258N000003 HC RX IP 258 OP 636: Performed by: STUDENT IN AN ORGANIZED HEALTH CARE EDUCATION/TRAINING PROGRAM

## 2023-06-12 PROCEDURE — 710N000010 HC RECOVERY PHASE 1, LEVEL 2, PER MIN: Performed by: SURGERY

## 2023-06-12 RX ORDER — DEXAMETHASONE SODIUM PHOSPHATE 4 MG/ML
INJECTION, SOLUTION INTRA-ARTICULAR; INTRALESIONAL; INTRAMUSCULAR; INTRAVENOUS; SOFT TISSUE PRN
Status: DISCONTINUED | OUTPATIENT
Start: 2023-06-12 | End: 2023-06-12

## 2023-06-12 RX ORDER — SODIUM CHLORIDE, SODIUM LACTATE, POTASSIUM CHLORIDE, CALCIUM CHLORIDE 600; 310; 30; 20 MG/100ML; MG/100ML; MG/100ML; MG/100ML
INJECTION, SOLUTION INTRAVENOUS CONTINUOUS PRN
Status: DISCONTINUED | OUTPATIENT
Start: 2023-06-12 | End: 2023-06-12

## 2023-06-12 RX ORDER — ESCITALOPRAM OXALATE 10 MG/1
5 TABLET ORAL
COMMUNITY
Start: 2023-02-06 | End: 2024-02-06

## 2023-06-12 RX ORDER — OXYCODONE HYDROCHLORIDE 5 MG/1
5 TABLET ORAL
Status: DISCONTINUED | OUTPATIENT
Start: 2023-06-12 | End: 2023-06-12 | Stop reason: HOSPADM

## 2023-06-12 RX ORDER — ONDANSETRON 2 MG/ML
4 INJECTION INTRAMUSCULAR; INTRAVENOUS EVERY 30 MIN PRN
Status: DISCONTINUED | OUTPATIENT
Start: 2023-06-12 | End: 2023-06-12 | Stop reason: HOSPADM

## 2023-06-12 RX ORDER — ONDANSETRON 4 MG/1
4 TABLET, ORALLY DISINTEGRATING ORAL EVERY 30 MIN PRN
Status: DISCONTINUED | OUTPATIENT
Start: 2023-06-12 | End: 2023-06-12 | Stop reason: HOSPADM

## 2023-06-12 RX ORDER — CEFAZOLIN SODIUM/WATER 2 G/20 ML
2 SYRINGE (ML) INTRAVENOUS SEE ADMIN INSTRUCTIONS
Status: DISCONTINUED | OUTPATIENT
Start: 2023-06-12 | End: 2023-06-12 | Stop reason: HOSPADM

## 2023-06-12 RX ORDER — BUPIVACAINE HYDROCHLORIDE 2.5 MG/ML
INJECTION, SOLUTION INFILTRATION; PERINEURAL PRN
Status: DISCONTINUED | OUTPATIENT
Start: 2023-06-12 | End: 2023-06-12 | Stop reason: HOSPADM

## 2023-06-12 RX ORDER — MAGNESIUM SULFATE HEPTAHYDRATE 40 MG/ML
2 INJECTION, SOLUTION INTRAVENOUS ONCE
Status: COMPLETED | OUTPATIENT
Start: 2023-06-12 | End: 2023-06-12

## 2023-06-12 RX ORDER — ACETAMINOPHEN 325 MG/1
650 TABLET ORAL
Status: DISCONTINUED | OUTPATIENT
Start: 2023-06-12 | End: 2023-06-12 | Stop reason: HOSPADM

## 2023-06-12 RX ORDER — FENTANYL CITRATE 50 UG/ML
50 INJECTION, SOLUTION INTRAMUSCULAR; INTRAVENOUS EVERY 5 MIN PRN
Status: DISCONTINUED | OUTPATIENT
Start: 2023-06-12 | End: 2023-06-12 | Stop reason: HOSPADM

## 2023-06-12 RX ORDER — FENTANYL CITRATE 50 UG/ML
25 INJECTION, SOLUTION INTRAMUSCULAR; INTRAVENOUS EVERY 5 MIN PRN
Status: DISCONTINUED | OUTPATIENT
Start: 2023-06-12 | End: 2023-06-12 | Stop reason: HOSPADM

## 2023-06-12 RX ORDER — OXYCODONE HYDROCHLORIDE 5 MG/1
5 TABLET ORAL ONCE
Status: COMPLETED | OUTPATIENT
Start: 2023-06-12 | End: 2023-06-12

## 2023-06-12 RX ORDER — OXYCODONE HYDROCHLORIDE 5 MG/1
5 TABLET ORAL
Status: COMPLETED | OUTPATIENT
Start: 2023-06-12 | End: 2023-06-12

## 2023-06-12 RX ORDER — ONDANSETRON 2 MG/ML
INJECTION INTRAMUSCULAR; INTRAVENOUS PRN
Status: DISCONTINUED | OUTPATIENT
Start: 2023-06-12 | End: 2023-06-12

## 2023-06-12 RX ORDER — CEFAZOLIN SODIUM/WATER 2 G/20 ML
2 SYRINGE (ML) INTRAVENOUS
Status: COMPLETED | OUTPATIENT
Start: 2023-06-12 | End: 2023-06-12

## 2023-06-12 RX ORDER — FENTANYL CITRATE 50 UG/ML
INJECTION, SOLUTION INTRAMUSCULAR; INTRAVENOUS PRN
Status: DISCONTINUED | OUTPATIENT
Start: 2023-06-12 | End: 2023-06-12

## 2023-06-12 RX ORDER — PROPOFOL 10 MG/ML
INJECTION, EMULSION INTRAVENOUS PRN
Status: DISCONTINUED | OUTPATIENT
Start: 2023-06-12 | End: 2023-06-12

## 2023-06-12 RX ORDER — LIDOCAINE HYDROCHLORIDE 20 MG/ML
INJECTION, SOLUTION INFILTRATION; PERINEURAL PRN
Status: DISCONTINUED | OUTPATIENT
Start: 2023-06-12 | End: 2023-06-12

## 2023-06-12 RX ORDER — SODIUM CHLORIDE, SODIUM LACTATE, POTASSIUM CHLORIDE, CALCIUM CHLORIDE 600; 310; 30; 20 MG/100ML; MG/100ML; MG/100ML; MG/100ML
INJECTION, SOLUTION INTRAVENOUS CONTINUOUS
Status: DISCONTINUED | OUTPATIENT
Start: 2023-06-12 | End: 2023-06-12 | Stop reason: HOSPADM

## 2023-06-12 RX ORDER — HYDROMORPHONE HCL IN WATER/PF 6 MG/30 ML
.2-.4 PATIENT CONTROLLED ANALGESIA SYRINGE INTRAVENOUS EVERY 10 MIN PRN
Status: COMPLETED | OUTPATIENT
Start: 2023-06-12 | End: 2023-06-12

## 2023-06-12 RX ORDER — DICYCLOMINE HYDROCHLORIDE 10 MG/1
10 CAPSULE ORAL
COMMUNITY
Start: 2023-06-08 | End: 2023-06-18

## 2023-06-12 RX ORDER — HYDROMORPHONE HYDROCHLORIDE 2 MG/1
2-4 TABLET ORAL EVERY 4 HOURS PRN
Qty: 12 TABLET | Refills: 0 | Status: SHIPPED | OUTPATIENT
Start: 2023-06-12

## 2023-06-12 RX ORDER — METHOCARBAMOL 100 MG/ML
1000 INJECTION, SOLUTION INTRAMUSCULAR; INTRAVENOUS ONCE
Status: COMPLETED | OUTPATIENT
Start: 2023-06-12 | End: 2023-06-12

## 2023-06-12 RX ORDER — OXYCODONE HYDROCHLORIDE 10 MG/1
10 TABLET ORAL
Status: DISCONTINUED | OUTPATIENT
Start: 2023-06-12 | End: 2023-06-12 | Stop reason: HOSPADM

## 2023-06-12 RX ORDER — LIDOCAINE 40 MG/G
CREAM TOPICAL
Status: DISCONTINUED | OUTPATIENT
Start: 2023-06-12 | End: 2023-06-12 | Stop reason: HOSPADM

## 2023-06-12 RX ADMIN — FENTANYL CITRATE 50 MCG: 50 INJECTION, SOLUTION INTRAMUSCULAR; INTRAVENOUS at 11:26

## 2023-06-12 RX ADMIN — FENTANYL CITRATE 50 MCG: 50 INJECTION, SOLUTION INTRAMUSCULAR; INTRAVENOUS at 12:15

## 2023-06-12 RX ADMIN — MIDAZOLAM 1 MG: 1 INJECTION INTRAMUSCULAR; INTRAVENOUS at 10:25

## 2023-06-12 RX ADMIN — SUGAMMADEX 200 MG: 100 INJECTION, SOLUTION INTRAVENOUS at 12:11

## 2023-06-12 RX ADMIN — Medication 2 G: at 10:45

## 2023-06-12 RX ADMIN — Medication 50 MG: at 10:37

## 2023-06-12 RX ADMIN — FENTANYL CITRATE 50 MCG: 50 INJECTION, SOLUTION INTRAMUSCULAR; INTRAVENOUS at 12:33

## 2023-06-12 RX ADMIN — SODIUM CHLORIDE, POTASSIUM CHLORIDE, SODIUM LACTATE AND CALCIUM CHLORIDE: 600; 310; 30; 20 INJECTION, SOLUTION INTRAVENOUS at 10:35

## 2023-06-12 RX ADMIN — FENTANYL CITRATE 25 MCG: 50 INJECTION, SOLUTION INTRAMUSCULAR; INTRAVENOUS at 12:41

## 2023-06-12 RX ADMIN — MAGNESIUM SULFATE IN WATER 2 G: 40 INJECTION, SOLUTION INTRAVENOUS at 15:24

## 2023-06-12 RX ADMIN — PHENYLEPHRINE HYDROCHLORIDE 150 MCG: 10 INJECTION INTRAVENOUS at 11:00

## 2023-06-12 RX ADMIN — FENTANYL CITRATE 25 MCG: 50 INJECTION, SOLUTION INTRAMUSCULAR; INTRAVENOUS at 12:55

## 2023-06-12 RX ADMIN — FENTANYL CITRATE 100 MCG: 50 INJECTION, SOLUTION INTRAMUSCULAR; INTRAVENOUS at 10:37

## 2023-06-12 RX ADMIN — LIDOCAINE HYDROCHLORIDE 100 MG: 20 INJECTION, SOLUTION INFILTRATION; PERINEURAL at 10:37

## 2023-06-12 RX ADMIN — OXYCODONE HYDROCHLORIDE 5 MG: 5 TABLET ORAL at 13:56

## 2023-06-12 RX ADMIN — FENTANYL CITRATE 50 MCG: 50 INJECTION, SOLUTION INTRAMUSCULAR; INTRAVENOUS at 12:27

## 2023-06-12 RX ADMIN — Medication 20 MG: at 11:27

## 2023-06-12 RX ADMIN — HYDROMORPHONE HYDROCHLORIDE 0.4 MG: 0.2 INJECTION, SOLUTION INTRAMUSCULAR; INTRAVENOUS; SUBCUTANEOUS at 13:25

## 2023-06-12 RX ADMIN — PROPOFOL 200 MG: 10 INJECTION, EMULSION INTRAVENOUS at 10:37

## 2023-06-12 RX ADMIN — OXYCODONE HYDROCHLORIDE 5 MG: 5 TABLET ORAL at 15:14

## 2023-06-12 RX ADMIN — ACETAMINOPHEN 650 MG: 325 TABLET, FILM COATED ORAL at 13:37

## 2023-06-12 RX ADMIN — DEXAMETHASONE SODIUM PHOSPHATE 4 MG: 4 INJECTION, SOLUTION INTRA-ARTICULAR; INTRALESIONAL; INTRAMUSCULAR; INTRAVENOUS; SOFT TISSUE at 10:37

## 2023-06-12 RX ADMIN — HYDROMORPHONE HYDROCHLORIDE 0.2 MG: 0.2 INJECTION, SOLUTION INTRAMUSCULAR; INTRAVENOUS; SUBCUTANEOUS at 13:10

## 2023-06-12 RX ADMIN — METHOCARBAMOL 1000 MG: 100 INJECTION, SOLUTION INTRAMUSCULAR; INTRAVENOUS at 15:43

## 2023-06-12 RX ADMIN — ONDANSETRON 4 MG: 2 INJECTION INTRAMUSCULAR; INTRAVENOUS at 12:12

## 2023-06-12 RX ADMIN — FENTANYL CITRATE 50 MCG: 50 INJECTION, SOLUTION INTRAMUSCULAR; INTRAVENOUS at 11:40

## 2023-06-12 RX ADMIN — Medication 20 MG: at 11:00

## 2023-06-12 ASSESSMENT — ACTIVITIES OF DAILY LIVING (ADL)
ADLS_ACUITY_SCORE: 35

## 2023-06-12 NOTE — ANESTHESIA POSTPROCEDURE EVALUATION
Patient: Reina Marmolejo    Procedure: Procedure(s):  Exploratory laparoscopy; intestinal plication; lysis of adhesion       Anesthesia Type:  No value filed.    Note:  Disposition: Outpatient   Postop Pain Control: Uneventful            Sign Out: Well controlled pain   PONV: No   Neuro/Psych: Uneventful            Sign Out: Acceptable/Baseline neuro status   Airway/Respiratory: Uneventful            Sign Out: Acceptable/Baseline resp. status   CV/Hemodynamics: Uneventful            Sign Out: Acceptable CV status; No obvious hypovolemia; No obvious fluid overload   Other NRE: NONE   DID A NON-ROUTINE EVENT OCCUR? No           Last vitals:  Vitals Value Taken Time   /77 06/12/23 1345   Temp 36.8  C (98.2  F) 06/12/23 1337   Pulse 67 06/12/23 1349   Resp 15 06/12/23 1349   SpO2 95 % 06/12/23 1349   Vitals shown include unvalidated device data.    Electronically Signed By: Kayode Erazo MD, MD  June 12, 2023  1:50 PM

## 2023-06-12 NOTE — OP NOTE
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     Surgeon: Surgeon(s) and Role:     * Malcolm Bartlett MD - Primary     * Vitaliy Juarez MD - Resident - Assisting   Anesthesia: General    Estimated blood loss: Less than 50 ml   Drains: None   Specimens: * No specimens in log *   Findings: Dilated common channel. RYGB anatomy. Plication of proximal common channel to distal BP limb.  Because the common channel was uniformly chronically dilated without active obstruction throughout its length, there would have been no clear spot to resect.  I also looked at the right lower quadrant; the appendix was perfectly normal in appearance; no scar tissue and no obvious endometriosis on the abdominal wall anteriorly.   Complications: None.   Implants: None.         COMORBIDITIES:   Past Medical History:   Diagnosis Date     Constipation      GERD (gastroesophageal reflux disease)      History of alcohol use disorder      History of gastric bypass      Hypothyroidism      Jejunal intussusception (H)      Tobacco use      Vitamin D deficiency        INDICATIONS FOR PROCEDURE  Reina Marmolejo is a 43 year old female who has previously undergone Daryn-en-Y gastric bypass surgery, and she developed recurrent jejunal intussusception associated with unclear source of abdominal pain located at the right lower abdomen.    After understanding the risks and benefits, she agreed to an operation to explore and intervene on her jejunal issues.    General risks related to abdominal surgery were reviewed with the patient.    These include, but are not limited to, death, myocardial infarction, pneumonia, urinary tract infection, deep venous  thrombosis with or without pulmonary embolus, abdominal infection from bowel injury or abscess, bowel obstruction, wound infection, and bleeding.    OPERATIVE PROCEDURE:  Under the benefits of general endotracheal anesthesia, the peritoneal cavity was entered using a left upper quadrant incision for Veress insufflation and 5mm port placement. Pneumoperitoneum to maximum pressure of 15mmHg.  5 ports were placed in total.  The camera port was a 5-mm port.     There were numerous intraabdominal adhesions and these were taken down using a combination of blunt, ultrasonic dissector, and scissors.  The adhesions were dense and required 30 additional minutes of OR time.    The alimentary limb, biliopancreatic limb, and common channel were all identified clearly by running the bowel to the GJ anastomosis, ligament of Treitz, and ileocecal valve respectively. The common channel was noted to be uniformly dilated throughout without any clear location of obstruction and no Meckel's nor clear transition point throughout its length. No clear active intussusception was noted at the time of surgery.     The common channel just distal to the JJ anastomosis was plicated to the biliopancreatic limb using 2-0 Surgidac in running fashion via the Endostitch device.    The abdomen was inspected for hemostasis.  Pneumoperitoneum was completely desufflated and all ports were removed.    The 12mm port site in the right abdomen was closed at the fascial level with 0 Vicryl and the PMI suture passer. 4-0 Monocryl and dermabond was used on the skin.  Sterile dressings were applied.  The patient tolerated the procedure well.       I was scrubbed for all critical components of the operation.    All sponge and needle counts were correct x 2 at the end of the procedure.      Malcolm Bartlett MD  Surgery  [phone] (hospital )  [phone] (clinic nurses)

## 2023-06-12 NOTE — ANESTHESIA CARE TRANSFER NOTE
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    Anesthesia Type:   No value filed.     Note:    Oropharynx: oropharynx clear of all foreign objects  Level of Consciousness: awake  Oxygen Supplementation: face mask    Independent Airway: airway patency satisfactory and stable  Dentition: dentition unchanged  Vital Signs Stable: post-procedure vital signs reviewed and stable  Report to RN Given: handoff report given  Patient transferred to: PACU  Comments: VSS. Patient endorsed some pain in PACU, gave 50mcg fentanyl before leaving OR and another 50mcg fentanyl at arrival in PACU. All questions answered to RN.   Handoff Report: Identifed the Patient, Identified the Reponsible Provider, Reviewed the pertinent medical history, Discussed the surgical course, Reviewed Intra-OP anesthesia mangement and issues during anesthesia, Set expectations for post-procedure period and Allowed opportunity for questions and acknowledgement of understanding      Vitals:  Vitals Value Taken Time   /79 06/12/23 1223   Temp     Pulse 79 06/12/23 1229   Resp 21 06/12/23 1229   SpO2 99 % 06/12/23 1229   Vitals shown include unvalidated device data.    Electronically Signed By: Ernie Self MD  June 12, 2023  12:30 PM

## 2023-06-12 NOTE — DISCHARGE INSTRUCTIONS
[facility]  Same-Day Surgery   Adult Discharge Orders & Instructions     For 24 hours after surgery    Get plenty of rest.  A responsible adult must stay with you for at least 24 hours after you leave the hospital.   Do not drive or use heavy equipment.  If you have weakness or tingling, don't drive or use heavy equipment until this feeling goes away.  Do not drink alcohol.  Avoid strenuous or risky activities.  Ask for help when climbing stairs.   You may feel lightheaded.  IF so, sit for a few minutes before standing.  Have someone help you get up.   If you have nausea (feel sick to your stomach): Drink only clear liquids such as apple juice, ginger ale, broth or 7-Up.  Rest may also help.  Be sure to drink enough fluids.  Move to a regular diet as you feel able.  You may have a slight fever. Call the doctor if your fever is over 100 F (37.7 C) (taken under the tongue) or lasts longer than 24 hours.  You may have a dry mouth, a sore throat, muscle aches or trouble sleeping.  These should go away after 24 hours.  Do not make important or legal decisions.   Call your doctor for any of the followin.  Signs of infection (fever, growing tenderness at the surgery site, a large amount of drainage or bleeding, severe pain, foul-smelling drainage, redness, swelling).    2. It has been over 8 to 10 hours since surgery and you are still not able to urinate (pass water).    3.  Headache for over 24 hours.    4.  Numbness, tingling or weakness the day after surgery (if you had spinal anesthesia).  To contact a doctor, call Dr Bartlett's clinic at [phone] or:    '   [phone] and ask for the resident on call for General Surgery (answered 24 hours a day)  '   Emergency Department:    [facility]: [phone]       (TTY for hearing impaired: [phone])    [facility]: [phone]       (TTY for hearing impaired: [phone])

## 2023-06-12 NOTE — ANESTHESIA PREPROCEDURE EVALUATION
Anesthesia Pre-Procedure Evaluation    Patient: Reina Marmolejo   MRN: [id number] : 1980        Procedure : Procedure(s):  Exploratory laparoscopy; intestinal plication;  possible small bowel resection; possible open          Past Medical History:   Diagnosis Date     Constipation      GERD (gastroesophageal reflux disease)      History of alcohol use disorder      History of gastric bypass      Hypothyroidism      Jejunal intussusception (H)      Tobacco use      Vitamin D deficiency       Past Surgical History:   Procedure Laterality Date     ARTHROSCOPY KNEE Left       SECTION       EGD       GASTRIC BYPASS       INSERT INTRAUTERINE DEVICE        Allergies   Allergen Reactions     Codeine Unknown     Hydrocodone-Acetaminophen      Other reaction(s): Unknown Reaction      Social History     Tobacco Use     Smoking status: Every Day     Packs/day: 0.50     Years: 20.00     Pack years: 10.00     Types: Cigarettes     Passive exposure: Current     Smokeless tobacco: Never   Vaping Use     Vaping status: Not on file   Substance Use Topics     Alcohol use: Yes     Comment: 2-3 drinks a month      Wt Readings from Last 1 Encounters:   23 72.6 kg (160 lb)        Anesthesia Evaluation   Pt has had prior anesthetic. Type: General.        ROS/MED HX  ENT/Pulmonary:  - neg pulmonary ROS     Neurologic:  - neg neurologic ROS     Cardiovascular:  - neg cardiovascular ROS     METS/Exercise Tolerance:     Hematologic:  - neg hematologic  ROS     Musculoskeletal:  - neg musculoskeletal ROS     GI/Hepatic:     (+) GERD,     Renal/Genitourinary:       Endo:     (+) thyroid problem,     Psychiatric/Substance Use:  - neg psychiatric ROS     Infectious Disease:       Malignancy:       Other:            Physical Exam    Airway  airway exam normal           Respiratory Devices and Support         Dental       (+) Modest Abnormalities - crowns, retainers, 1 or 2 missing teeth      Cardiovascular   cardiovascular  exam normal          Pulmonary   pulmonary exam normal                OUTSIDE LABS:  CBC: No results found for: WBC, HGB, HCT, PLT  BMP: No results found for: NA, POTASSIUM, CHLORIDE, CO2, BUN, CR, GLC  COAGS: No results found for: PTT, INR, FIBR  POC: No results found for: BGM, HCG, HCGS  HEPATIC: No results found for: ALBUMIN, PROTTOTAL, ALT, AST, GGT, ALKPHOS, BILITOTAL, BILIDIRECT, FAN  OTHER: No results found for: PH, LACT, A1C, FELICIA, PHOS, MAG, LIPASE, AMYLASE, TSH, T4, T3, CRP, SED    Anesthesia Plan    ASA Status:  3   NPO Status:  NPO Appropriate    Anesthesia Type: General.     - Airway: ETT   Induction: Intravenous.   Maintenance: TIVA.   Techniques and Equipment:     - AVOID: Avoid NG/OG         Consents    Anesthesia Plan(s) and associated risks, benefits, and realistic alternatives discussed. Questions answered and patient/representative(s) expressed understanding.     - Discussed: Risks, Benefits and Alternatives for BOTH SEDATION and the PROCEDURE were discussed     - Discussed with:  Patient         Postoperative Care    Pain management: Oral pain medications.   PONV prophylaxis: Ondansetron (or other 5HT-3), Dexamethasone or Solumedrol     Comments:                Ernie Self MD

## 2023-06-12 NOTE — ANESTHESIA PROCEDURE NOTES
Airway         Procedure Start/Stop Times: 6/12/2023 10:39 AM  Staff -        Anesthesiologist:  Behrens, Christopher J, MD       Resident/Fellow: Ernie Self MD       Performed By: residentIndications and Patient Condition       Indications for airway management: baldemar-procedural         Mask difficulty assessment: 1 - vent by mask    Final Airway Details       Final airway type: endotracheal airway       Successful airway: ETT - single  Endotracheal Airway Details        ETT size (mm): 7.5       Cuffed: yes       Successful intubation technique: direct laryngoscopy       DL Blade Type: MAC 4       Grade View of Cords: 1       Adjucts: stylet       Position: Center       Measured from: gums/teeth       Secured at (cm): 23       Bite block used: None    Post intubation assessment        Placement verified by: capnometry and equal breath sounds        Number of attempts at approach: 1       Number of other approaches attempted: 0       Secured with: pink tape       Ease of procedure: easy       Dentition: Intact    Medication(s) Administered   Medication Administration Time: 6/12/2023 10:39 AM

## 2023-06-14 ENCOUNTER — PATIENT OUTREACH (OUTPATIENT)
Dept: ENDOCRINOLOGY | Facility: CLINIC | Age: 43
End: 2023-06-14
Payer: COMMERCIAL

## 2023-06-14 NOTE — PROGRESS NOTES
RN Post-Op/Post-Discharge Care Coordination Note    Ms. Reina Marmolejo is a 43 year old female who underwent Laparoscopy, Diagnostic/Exploratory with plication and lysis of adhesions on 06/12/23 with  Malcolm Bartlett MD.  Left message for patient.  Will attempt call again.

## 2023-06-20 ENCOUNTER — TELEPHONE (OUTPATIENT)
Dept: SURGERY | Facility: CLINIC | Age: 43
End: 2023-06-20
Payer: COMMERCIAL

## 2023-06-20 NOTE — TELEPHONE ENCOUNTER
General Call    Contacts       Type Contact Phone/Fax    06/20/2023 01:30 PM CDT Phone (Incoming) Jannami Reina J (Self) [phone] (M)        Reason for Call:  Pt needs call from nurse. Post op questions, and too much pain.      [facility] or would you prefer to receive a phone call?:   Patient would prefer a phone call   Okay to leave a detailed message?: Yes at Cell number on file:    Telephone Information:   Mobile [phone]

## 2023-06-20 NOTE — TELEPHONE ENCOUNTER
Patient had an exploratory lab 6/12/23.  Missed her postop phone calls. Patient states she is fatigued by the end of the day and is pretty sore still.  Encouraged patient to pace herself and not overdue her activity.  Patient has returned to work. Encouraged patient to continue with alternating ice and heat.  Patient is back to a normal diet but has a lack of appetite.  Is getting in adequate fluids.   Will contact office if she has any additional questions.

## 2024-03-10 ENCOUNTER — HEALTH MAINTENANCE LETTER (OUTPATIENT)
Age: 44
End: 2024-03-10

## 2025-03-16 ENCOUNTER — HEALTH MAINTENANCE LETTER (OUTPATIENT)
Age: 45
End: 2025-03-16

## (undated) DEVICE — DEVICE ENDO STITCH APPLIER 10MM 173016

## (undated) DEVICE — ANTIFOG SOLUTION W/FOAM PAD 31142527

## (undated) DEVICE — DECANTER VIAL 2006S

## (undated) DEVICE — SU DERMABOND ADVANCED .7ML DNX12

## (undated) DEVICE — TUBING SMOKE EVAC PNEUMOCLEAR HIGH FLOW 0620050250

## (undated) DEVICE — SOL WATER IRRIG 1000ML BOTTLE 2F7114

## (undated) DEVICE — SU VICRYL 0 UR-6 27" J603H

## (undated) DEVICE — DEVICE SUTURE PASSER 14GA WECK EFX EFXSP2

## (undated) DEVICE — LINEN TOWEL PACK X5 5464

## (undated) DEVICE — ENDO TROCAR FIRST ENTRY KII FIOS ADV FIX 05X100MM CFF03

## (undated) DEVICE — SU ENDO STITCH SURGIDAC 2-0 ES-9 7" TRIPLE STITCH 170041

## (undated) DEVICE — ESU HARMONIC ACE LAP SHEARS ETHICON ACE+ 7 5MMX36CM HARH36

## (undated) DEVICE — SU MONOCRYL 4-0 PS-2 27" UND Y426H

## (undated) DEVICE — ENDO TROCAR FIRST ENTRY KII FIOS ADV FIX 12X100MM CFF73

## (undated) DEVICE — GLOVE BIOGEL PI ULTRATOUCH SZ 7.5 41175

## (undated) DEVICE — ESU ENDO SCISSORS 5MM CVD 5DCS

## (undated) DEVICE — NDL INSUFFLATION 13GA 120MM C2201

## (undated) DEVICE — Device

## (undated) DEVICE — ENDO TROCAR SLEEVE KII ADV FIXATION 05X100MM CFS02

## (undated) DEVICE — LINEN TOWEL PACK X6 WHITE 5487

## (undated) RX ORDER — OXYCODONE HYDROCHLORIDE 5 MG/1
TABLET ORAL
Status: DISPENSED
Start: 2023-06-12

## (undated) RX ORDER — HYDROMORPHONE HCL IN WATER/PF 6 MG/30 ML
PATIENT CONTROLLED ANALGESIA SYRINGE INTRAVENOUS
Status: DISPENSED
Start: 2023-06-12

## (undated) RX ORDER — CEFAZOLIN SODIUM/WATER 2 G/20 ML
SYRINGE (ML) INTRAVENOUS
Status: DISPENSED
Start: 2023-06-12

## (undated) RX ORDER — ACETAMINOPHEN 325 MG/1
TABLET ORAL
Status: DISPENSED
Start: 2023-06-12

## (undated) RX ORDER — FENTANYL CITRATE 50 UG/ML
INJECTION, SOLUTION INTRAMUSCULAR; INTRAVENOUS
Status: DISPENSED
Start: 2023-06-12

## (undated) RX ORDER — BUPIVACAINE HYDROCHLORIDE 5 MG/ML
INJECTION, SOLUTION EPIDURAL; INTRACAUDAL
Status: DISPENSED
Start: 2023-06-12

## (undated) RX ORDER — MAGNESIUM SULFATE HEPTAHYDRATE 40 MG/ML
INJECTION, SOLUTION INTRAVENOUS
Status: DISPENSED
Start: 2023-06-12